# Patient Record
Sex: FEMALE | Race: BLACK OR AFRICAN AMERICAN | Employment: UNEMPLOYED | URBAN - METROPOLITAN AREA
[De-identification: names, ages, dates, MRNs, and addresses within clinical notes are randomized per-mention and may not be internally consistent; named-entity substitution may affect disease eponyms.]

---

## 2020-05-18 ENCOUNTER — ED HISTORICAL/CONVERTED ENCOUNTER (OUTPATIENT)
Dept: OTHER | Age: 30
End: 2020-05-18

## 2020-06-22 ENCOUNTER — APPOINTMENT (OUTPATIENT)
Dept: ULTRASOUND IMAGING | Age: 30
End: 2020-06-22
Attending: EMERGENCY MEDICINE
Payer: MEDICAID

## 2020-06-22 ENCOUNTER — HOSPITAL ENCOUNTER (EMERGENCY)
Age: 30
Discharge: HOME | End: 2020-06-22
Attending: EMERGENCY MEDICINE
Payer: MEDICAID

## 2020-06-22 VITALS
HEART RATE: 96 BPM | WEIGHT: 134 LBS | RESPIRATION RATE: 18 BRPM | SYSTOLIC BLOOD PRESSURE: 150 MMHG | OXYGEN SATURATION: 99 % | TEMPERATURE: 97.5 F | DIASTOLIC BLOOD PRESSURE: 106 MMHG | HEIGHT: 63 IN | BODY MASS INDEX: 23.74 KG/M2

## 2020-06-22 DIAGNOSIS — A59.01 TRICHOMONAL VAGINITIS: Primary | ICD-10-CM

## 2020-06-22 DIAGNOSIS — O46.90 VAGINAL BLEEDING DURING PREGNANCY: ICD-10-CM

## 2020-06-22 LAB
ABO + RH BLD: NORMAL
ALBUMIN SERPL-MCNC: 3.5 G/DL (ref 3.4–5)
ALBUMIN/GLOB SERPL: 0.9 {RATIO} (ref 0.8–1.7)
ALP SERPL-CCNC: 121 U/L (ref 45–117)
ALT SERPL-CCNC: 18 U/L (ref 13–56)
ANION GAP SERPL CALC-SCNC: 7 MMOL/L (ref 3–18)
APPEARANCE UR: CLEAR
AST SERPL-CCNC: 17 U/L (ref 10–38)
BACTERIA URNS QL MICRO: ABNORMAL /HPF
BASOPHILS # BLD: 0 K/UL (ref 0–0.1)
BASOPHILS NFR BLD: 1 % (ref 0–2)
BILIRUB SERPL-MCNC: 0.4 MG/DL (ref 0.2–1)
BILIRUB UR QL: NEGATIVE
BUN SERPL-MCNC: 8 MG/DL (ref 7–18)
BUN/CREAT SERPL: 13 (ref 12–20)
CALCIUM SERPL-MCNC: 8.3 MG/DL (ref 8.5–10.1)
CHLORIDE SERPL-SCNC: 110 MMOL/L (ref 100–111)
CO2 SERPL-SCNC: 26 MMOL/L (ref 21–32)
COLOR UR: YELLOW
CREAT SERPL-MCNC: 0.64 MG/DL (ref 0.6–1.3)
DIFFERENTIAL METHOD BLD: ABNORMAL
EOSINOPHIL # BLD: 0.2 K/UL (ref 0–0.4)
EOSINOPHIL NFR BLD: 2 % (ref 0–5)
EPITH CASTS URNS QL MICRO: ABNORMAL /LPF (ref 0–5)
ERYTHROCYTE [DISTWIDTH] IN BLOOD BY AUTOMATED COUNT: 14.3 % (ref 11.6–14.5)
GLOBULIN SER CALC-MCNC: 3.8 G/DL (ref 2–4)
GLUCOSE SERPL-MCNC: 88 MG/DL (ref 74–99)
GLUCOSE UR STRIP.AUTO-MCNC: NEGATIVE MG/DL
HCG SERPL-ACNC: 6987 MIU/ML (ref 0–10)
HCG UR QL: POSITIVE
HCT VFR BLD AUTO: 37.2 % (ref 35–45)
HGB BLD-MCNC: 12.1 G/DL (ref 12–16)
HGB UR QL STRIP: NEGATIVE
KETONES UR QL STRIP.AUTO: NEGATIVE MG/DL
LEUKOCYTE ESTERASE UR QL STRIP.AUTO: ABNORMAL
LYMPHOCYTES # BLD: 2.9 K/UL (ref 0.9–3.6)
LYMPHOCYTES NFR BLD: 44 % (ref 21–52)
MCH RBC QN AUTO: 31.1 PG (ref 24–34)
MCHC RBC AUTO-ENTMCNC: 32.5 G/DL (ref 31–37)
MCV RBC AUTO: 95.6 FL (ref 74–97)
MONOCYTES # BLD: 0.4 K/UL (ref 0.05–1.2)
MONOCYTES NFR BLD: 7 % (ref 3–10)
NEUTS SEG # BLD: 3.1 K/UL (ref 1.8–8)
NEUTS SEG NFR BLD: 46 % (ref 40–73)
NITRITE UR QL STRIP.AUTO: NEGATIVE
PH UR STRIP: 7 [PH] (ref 5–8)
PLATELET # BLD AUTO: 265 K/UL (ref 135–420)
PMV BLD AUTO: 10.4 FL (ref 9.2–11.8)
POTASSIUM SERPL-SCNC: 3.3 MMOL/L (ref 3.5–5.5)
PROT SERPL-MCNC: 7.3 G/DL (ref 6.4–8.2)
PROT UR STRIP-MCNC: NEGATIVE MG/DL
RBC # BLD AUTO: 3.89 M/UL (ref 4.2–5.3)
RBC #/AREA URNS HPF: ABNORMAL /HPF (ref 0–5)
SERVICE CMNT-IMP: NORMAL
SODIUM SERPL-SCNC: 143 MMOL/L (ref 136–145)
SP GR UR REFRACTOMETRY: 1.01 (ref 1–1.03)
UROBILINOGEN UR QL STRIP.AUTO: 0.2 EU/DL (ref 0.2–1)
WBC # BLD AUTO: 6.6 K/UL (ref 4.6–13.2)
WBC URNS QL MICRO: ABNORMAL /HPF (ref 0–5)
WET PREP GENITAL: NORMAL

## 2020-06-22 PROCEDURE — 85025 COMPLETE CBC W/AUTO DIFF WBC: CPT

## 2020-06-22 PROCEDURE — 86900 BLOOD TYPING SEROLOGIC ABO: CPT

## 2020-06-22 PROCEDURE — 87661 TRICHOMONAS VAGINALIS AMPLIF: CPT

## 2020-06-22 PROCEDURE — 99284 EMERGENCY DEPT VISIT MOD MDM: CPT

## 2020-06-22 PROCEDURE — 81025 URINE PREGNANCY TEST: CPT

## 2020-06-22 PROCEDURE — 84702 CHORIONIC GONADOTROPIN TEST: CPT

## 2020-06-22 PROCEDURE — 80053 COMPREHEN METABOLIC PANEL: CPT

## 2020-06-22 PROCEDURE — 74011250637 HC RX REV CODE- 250/637: Performed by: EMERGENCY MEDICINE

## 2020-06-22 PROCEDURE — 81001 URINALYSIS AUTO W/SCOPE: CPT

## 2020-06-22 PROCEDURE — 87210 SMEAR WET MOUNT SALINE/INK: CPT

## 2020-06-22 PROCEDURE — 76817 TRANSVAGINAL US OBSTETRIC: CPT

## 2020-06-22 RX ORDER — METRONIDAZOLE 250 MG/1
2000 TABLET ORAL
Status: COMPLETED | OUTPATIENT
Start: 2020-06-22 | End: 2020-06-22

## 2020-06-22 RX ADMIN — METRONIDAZOLE 2000 MG: 250 TABLET ORAL at 09:07

## 2020-06-22 NOTE — ED PROVIDER NOTES
EMERGENCY DEPARTMENT HISTORY AND PHYSICAL EXAM    Date: 6/22/2020  Patient Name: Teresa Tang    History of Presenting Illness     Chief Complaint   Patient presents with    Vaginal Bleeding         History Provided By: Patient    Additional History (Context):   Teresa Tang is a 26 y.o. female G4, P3 presents to the emergency department C/O persistent vaginal bleeding ongoing for the last 21 days. Patient states that she had her period on May 25. States that she then started bleeding on June 5. Reports that the vaginal bleeding is minimal.  States that it is usually with \"wiping\". States that she does not use a pad or tampon for the vaginal bleeding as it is light and  \"some days I am not bleeding\". Reports that she took a pregnancy test yesterday night and it was positive. States that she went to an [facility] emergency department for similar complaints \"a couple days ago\" however did not receive an ultrasound or blood work. Patient reports nausea, weight loss and vomiting. States \"this is what happens when I am pregnant\". Patient also reporting pelvic cramping. Pt denies diarrhea or fever, and any other sxs or complaints. PCP: None        Past History     Past Medical History:  History reviewed. No pertinent past medical history. Past Surgical History:  History reviewed. No pertinent surgical history. Family History:  History reviewed. No pertinent family history. Social History:  Social History     Tobacco Use    Smoking status: Current Every Day Smoker     Packs/day: 0.50    Smokeless tobacco: Never Used   Substance Use Topics    Alcohol use: Yes     Comment: occasionally    Drug use: Yes     Types: Marijuana       Allergies:  No Known Allergies      Review of Systems   Review of Systems   Constitutional: Negative for chills and fever. HENT: Negative for congestion, ear pain, sinus pain and sore throat. Eyes: Negative for pain and visual disturbance.    Respiratory: Negative for cough and shortness of breath. Cardiovascular: Negative for chest pain and leg swelling. Gastrointestinal: Positive for nausea and vomiting. Negative for abdominal pain, constipation and diarrhea. Genitourinary: Positive for pelvic pain and vaginal bleeding. Negative for dysuria, hematuria and vaginal discharge. Musculoskeletal: Negative for back pain and neck pain. Skin: Negative for rash and wound. Neurological: Negative for dizziness, tremors, weakness, light-headedness and numbness. All other systems reviewed and are negative. Physical Exam     Vitals:    06/22/20 0649   BP: (!) 150/106   Pulse: 96   Resp: 18   Temp: 97.5 °F (36.4 °C)   SpO2: 99%   Weight: 60.8 kg (134 lb)   Height: 5' 3\" (1.6 m)     Physical Exam    Nursing note and vitals reviewed    Constitutional: Abdi Gonzalez -American female, no acute distress  Head: Normocephalic, Atraumatic  Eyes: Pupils are equal, round, and reactive to light, EOMI  Neck: Supple, non-tender  Cardiovascular: Regular rate and rhythm, no murmurs, rubs, or gallops, + 2 radial pulses bilaterally  Chest: Normal work of breathing and chest excursion bilaterally  Lungs: Clear to ausculation bilaterally, no wheezes, no rhonchi  Abdomen: Soft, non tender, non distended, normoactive bowel sounds  Back: No evidence of trauma or deformity  Extremities: No evidence of trauma or deformity, no LE edema.  No streaking erythema, vesicular lesions, ulcerations or bulla  Skin: Warm and dry, normal cap refill  Neuro: Alert and appropriate, CN intact, normal speech, moving all 4 extremities freely and symmetrically  Psychiatric: Normal mood and affect       Diagnostic Study Results     Labs -     Recent Results (from the past 12 hour(s))   URINALYSIS W/ RFLX MICROSCOPIC    Collection Time: 06/22/20  7:15 AM   Result Value Ref Range    Color YELLOW      Appearance CLEAR      Specific gravity 1.007 1.005 - 1.030      pH (UA) 7.0 5.0 - 8.0      Protein Negative NEG mg/dL Glucose Negative NEG mg/dL    Ketone Negative NEG mg/dL    Bilirubin Negative NEG      Blood Negative NEG      Urobilinogen 0.2 0.2 - 1.0 EU/dL    Nitrites Negative NEG      Leukocyte Esterase SMALL (A) NEG     URINE MICROSCOPIC ONLY    Collection Time: 06/22/20  7:15 AM   Result Value Ref Range    WBC 4 to 10 0 - 5 /hpf    RBC 0 to 3 0 - 5 /hpf    Epithelial cells FEW 0 - 5 /lpf    Bacteria FEW (A) NEG /hpf   HCG URINE, QL. - POC    Collection Time: 06/22/20  7:21 AM   Result Value Ref Range    Pregnancy test,urine (POC) Positive (A) NEG     CBC WITH AUTOMATED DIFF    Collection Time: 06/22/20  7:55 AM   Result Value Ref Range    WBC 6.6 4.6 - 13.2 K/uL    RBC 3.89 (L) 4.20 - 5.30 M/uL    HGB 12.1 12.0 - 16.0 g/dL    HCT 37.2 35.0 - 45.0 %    MCV 95.6 74.0 - 97.0 FL    MCH 31.1 24.0 - 34.0 PG    MCHC 32.5 31.0 - 37.0 g/dL    RDW 14.3 11.6 - 14.5 %    PLATELET 532 680 - 515 K/uL    MPV 10.4 9.2 - 11.8 FL    NEUTROPHILS 46 40 - 73 %    LYMPHOCYTES 44 21 - 52 %    MONOCYTES 7 3 - 10 %    EOSINOPHILS 2 0 - 5 %    BASOPHILS 1 0 - 2 %    ABS. NEUTROPHILS 3.1 1.8 - 8.0 K/UL    ABS. LYMPHOCYTES 2.9 0.9 - 3.6 K/UL    ABS. MONOCYTES 0.4 0.05 - 1.2 K/UL    ABS. EOSINOPHILS 0.2 0.0 - 0.4 K/UL    ABS.  BASOPHILS 0.0 0.0 - 0.1 K/UL    DF AUTOMATED     BLOOD TYPE, (ABO+RH)    Collection Time: 06/22/20  7:55 AM   Result Value Ref Range    ABO/Rh(D) B POSITIVE    METABOLIC PANEL, COMPREHENSIVE    Collection Time: 06/22/20  7:55 AM   Result Value Ref Range    Sodium 143 136 - 145 mmol/L    Potassium 3.3 (L) 3.5 - 5.5 mmol/L    Chloride 110 100 - 111 mmol/L    CO2 26 21 - 32 mmol/L    Anion gap 7 3.0 - 18 mmol/L    Glucose 88 74 - 99 mg/dL    BUN 8 7.0 - 18 MG/DL    Creatinine 0.64 0.6 - 1.3 MG/DL    BUN/Creatinine ratio 13 12 - 20      GFR est AA >60 >60 ml/min/1.73m2    GFR est non-AA >60 >60 ml/min/1.73m2    Calcium 8.3 (L) 8.5 - 10.1 MG/DL    Bilirubin, total 0.4 0.2 - 1.0 MG/DL    ALT (SGPT) 18 13 - 56 U/L    AST (SGOT) 17 10 - 38 U/L    Alk. phosphatase 121 (H) 45 - 117 U/L    Protein, total 7.3 6.4 - 8.2 g/dL    Albumin 3.5 3.4 - 5.0 g/dL    Globulin 3.8 2.0 - 4.0 g/dL    A-G Ratio 0.9 0.8 - 1.7     BETA HCG, QT    Collection Time: 06/22/20  7:55 AM   Result Value Ref Range    Beta HCG, QT 6,987 (H) 0 - 10 MIU/ML   WET PREP    Collection Time: 06/22/20  8:07 AM   Result Value Ref Range    Special Requests: NO SPECIAL REQUESTS      Wet prep FEW  MOTILE TRICHOMONAS NOTED        Wet prep NO YEAST SEEN      Wet prep CLUE CELLS ABSENT         Radiologic Studies -   US OB TV W DOPPLER   Final Result   IMPRESSION:      No suspicious abnormality. No intrauterine gestational sac is evident. If the   patient is pregnant, this represents a pregnancy of unknown location and   appropriate follow-up would then be recommended        CT Results  (Last 48 hours)    None        CXR Results  (Last 48 hours)    None            Medical Decision Making   I am the first provider for this patient. I reviewed the vital signs, available nursing notes, past medical history, past surgical history, family history and social history. Vital Signs-Reviewed the patient's vital signs. Pulse Oximetry Analysis -99 % on room air    Cardiac Monitor:  Rate: 96 bpm  Rhythm: Regular    Records Reviewed: Nursing Notes and Old Medical Records    Provider Notes:   26 y.o. female presenting with vaginal bleeding since June 5. Last menstrual period was May 25. States that she took a pregnancy test last night and was positive. On exam patient is afebrile with appropriate vital signs. She has benign abdominal exams and does not appear toxic or acutely ill. Patient's point-of-care hCG is positive. Will check hCG quant, Rh status. Will perform pelvic exam and obtain transvaginal ultrasound to eval for ectopic, miscarriage. Procedures:  Procedures    ED Course:   7:07 AM   Initial assessment performed.  The patients presenting problems have been discussed, and they are in agreement with the care plan formulated and outlined with them. I have encouraged them to ask questions as they arise throughout their visit. Exam was chaperoned by Sky Art RN  Normal external genitalia. Physiologic discharge, no blood or active bleeding in the vault. Normal cervix appearance. On bimanual exam, no cervical, uterine, or adnexal tenderness. 9:02 AM  Patient is B+. Hemoglobin stable at 12. 1.  hCG quant is 6900. This is above the threshold for ultrasound however no intrauterine gestational sac is evident. This could represent a pregnancy of unknown location. Discussed the findings of her ultrasound and hCG quant. Discussed possibility of miscarriage. Patient urged to return to the emergency department in 2 days to get her hCG quant rechecked. Wet prep consistent with trichomonal infection. Treated with 2 g Flagyl. UA not consistent with a UTI, likely contaminant. Diagnosis and Disposition       DISCHARGE NOTE:  9:02 AM    Brian Driver  results have been reviewed with her. She has been counseled regarding her diagnosis, treatment, and plan. She verbally conveys understanding and agreement of the signs, symptoms, diagnosis, treatment and prognosis and additionally agrees to follow up as discussed. She also agrees with the care-plan and conveys that all of her questions have been answered. I have also provided discharge instructions for her that include: educational information regarding their diagnosis and treatment, and list of reasons why they would want to return to the ED prior to their follow-up appointment, should her condition change. She has been provided with education for proper emergency department utilization. CLINICAL IMPRESSION:    1. Trichomonal vaginitis    2. Vaginal bleeding during pregnancy        PLAN:  1. D/C Home  2. There are no discharge medications for this patient.     3.   Follow-up Information     Follow up With Specialties Details Why Contact Info    [facility]  Schedule an appointment as soon as possible for a visit in 2 days   [address]  [place], [address] [place] [address],[address]    Yoana Rojas MD Obstetrics & Gynecology Schedule an appointment as soon as possible for a visit in 2 days  [address]      [facility] EMERGENCY DEPT Emergency Medicine Go in 2 days to get your pregnancy hormone rechecked [address] [place] [address] [facility]    [facility] EMERGENCY DEPT Emergency Medicine  As needed if symptoms worsen [address] [place] [id number]  [phone]        ____________________________________     Please note that this dictation was completed with [employer], the computer voice recognition software. Quite often unanticipated grammatical, syntax, homophones, and other interpretive errors are inadvertently transcribed by the computer software. Please disregard these errors. Please excuse any errors that have escaped final proofreading.

## 2020-06-22 NOTE — ED TRIAGE NOTES
Patient arrives ambulatory to ED with c/c of wanting to know if she is pregnant or not. Last month she took a pregnancy test and it was positive, the next day she began to bleed and she's been bleeding ever since (21 days straight), patient took a pregnancy test last night and it was positive. Patient reports losing a lot of weight and she only does that when she is pregnant.

## 2020-06-22 NOTE — DISCHARGE INSTRUCTIONS
You were seen and evaluated in the Emergency Department. Please understand that your work up is not all encompassing and you should follow up with your primary care physician for further management and continuity of care. Please return to Emergency Department or seek medical attention immediately if you have acute worsening in your symptoms or develop chest pain, shortness of breath, repeated vomiting, fever, altered level of consciousness, coughing up blood, or start sweating and feel clammy. If you were prescribed any medicine for home, please take as prescribed by your health-care provider. If you were given any follow-up appointments or numbers to call, please do so as instructed. Avoid any tobacco products or excessive alcohol. Patient Education        Vaginal Bleeding During Pregnancy: Care Instructions  Your Care Instructions     Many women have some vaginal bleeding when they are pregnant. In some cases, the bleeding is not serious. And there aren't any more problems with the pregnancy. But sometimes bleeding is a sign of a more serious problem. This is more common if the bleeding is heavy or painful. Examples of more serious problems include miscarriage, an ectopic pregnancy, or a problem with the placenta. You may have to see your doctor again to be sure everything is okay. You may also need more tests to find the cause of the bleeding. For some women, home treatment is all they need. But it depends on what is causing the bleeding. Be sure to tell your doctor if you have any new symptoms or if your symptoms get worse. The doctor has checked you carefully, but problems can develop later. If you notice any problems or new symptoms, get medical treatment right away. Follow-up care is a key part of your treatment and safety. Be sure to make and go to all appointments, and call your doctor if you are having problems.  It's also a good idea to know your test results and keep a list of the medicines you take. How can you care for yourself at home? · If your doctor prescribed medicines, take them exactly as directed. Call your doctor if you think you are having a problem with your medicine. · Do not have sex until the bleeding stops and your doctor says it's okay. · Ask your doctor about other activities you can or can't do. · Get a lot of rest. Being pregnant can make you tired. · Eat a healthy diet. Include a lot of peas, beans, and leafy green vegetables. Talk to a dietitian if you need help planning your diet. · Do not use nonsteroidal anti-inflammatory drugs (NSAIDs), such as ibuprofen (Advil, Motrin), naproxen (Aleve), or aspirin, unless your doctor says it is okay. When should you call for help? [id number] anytime you think you may need emergency care. For example, call if:  · You passed out (lost consciousness). · You have severe vaginal bleeding. Call your doctor now or seek immediate medical care if:  · You have any vaginal bleeding. · You have pain in your belly or pelvis. · You think that you are in labor. · You have a sudden release of fluid from your vagina. · You notice that your baby has stopped moving or is moving much less than normal.  Watch closely for changes in your health, and be sure to contact your doctor if you have any questions or concerns. Where can you learn more? Go to http://can-jaylin.info/  Enter J785 in the search box to learn more about \"Vaginal Bleeding During Pregnancy: Care Instructions. \"  Current as of: February 11, 2020               Content Version: 12.5  © 4747-7651 Healthwise, Incorporated. Care instructions adapted under license by [employer] (which disclaims liability or warranty for this information). If you have questions about a medical condition or this instruction, always ask your healthcare professional. [address] any warranty or liability for your use of this information. Patient Education        Trichomoniasis: Care Instructions  Your Care Instructions  Trichomoniasis is a sexually transmitted infection (STI) that is spread by having sex with an infected partner. Trichomoniasis is commonly called trich (say \"trick\"). In women, trich may cause vaginal itching and a smelly discharge. But in many cases, especially in men, there are no symptoms. Pecola Dirk is treated so that you do not spread it to others. Both you and your sex partner or partners should be treated at the same time so you do not infect each other again. Trich may cause problems with pregnancy. Your doctor will talk with you about treatment for Trich if you are pregnant. Follow-up care is a key part of your treatment and safety. Be sure to make and go to all appointments, and call your doctor if you are having problems. It's also a good idea to know your test results and keep a list of the medicines you take. How can you care for yourself at home? · Take your antibiotics as directed. Do not stop taking them just because you feel better. You need to take the full course of antibiotics. · Do not have sex while you are being treated. If your doctor gave you a single dose of antibiotics, do not have sex for one week after being treated and until your partner also has been treated. · Tell your sex partner (or partners) that he or she will also need to be tested and treated. · Use a cold water compress or cool baths to relieve itching. To prevent trichomoniasis in the future  · Use latex condoms every time you have sex. Use them from the beginning to the end of sexual contact. · Talk to your partner before having sex. Find out if he or she has or is at risk for trich or any other STI. Keep in mind that a person may be able to spread an STI even if he or she does not have symptoms. · Do not have sex if you are being treated for trich or any other STI.   · Do not have sex with anyone who has symptoms of an STI, such as sores on the genitals or mouth. · Having one sex partner (who does not have STIs and does not have sex with anyone else) is a good way to avoid STIs. When should you call for help? Call your doctor now or seek immediate medical care if:  · You have unusual vaginal bleeding. · You have a fever. · You have new discharge from the vagina or penis. · You have pelvic pain. Watch closely for changes in your health, and be sure to contact your doctor if:  · You do not get better as expected. · You have any new symptoms or your symptoms get worse. Where can you learn more? Go to http://can-jaylin.info/  Enter P819 in the search box to learn more about \"Trichomoniasis: Care Instructions. \"  Current as of: February 26, 2020               Content Version: 12.5  © 2006-2020 [email or web address]. Care instructions adapted under license by [employer] (which disclaims liability or warranty for this information). If you have questions about a medical condition or this instruction, always ask your healthcare professional. [place] [address] any warranty or liability for your use of this information. Patient Education     Threatened Miscarriage: After Your Visit to the Emergency Room  Your Care Instructions  Some women have light spotting or bleeding during the first 12 weeks of pregnancy. In some cases this is normal. Light spotting or bleeding can also be a sign of a possible loss of the pregnancy. This is called a threatened miscarriage. At this point, the doctor may not be able to tell if your vaginal bleeding is normal or a threatened miscarriage. In early pregnancy, things such as stress, exercise, and sex do not cause vaginal bleeding or miscarriage. You may be worried or upset about the possibility of losing your pregnancy. But do not blame yourself. There is no treatment to stop a threatened miscarriage.  If you do have a miscarriage, there was nothing you could have done to prevent it. A miscarriage usually means that the pregnancy is not developing normally. Even though you have been released from the emergency room, you still need to watch for any problems. The doctor carefully checked you. But sometimes problems can develop later. If you have new symptoms, or if your symptoms do not get better, return to the emergency room or call your doctor right away. A visit to the emergency room is only one step in your treatment. Even if you feel better, you still need to do what your doctor recommends, such as going to all suggested follow-up appointments and taking medicines exactly as directed. This will help you recover and help prevent future problems. How can you care for yourself at home? · If you do have a miscarriage, you will probably have some vaginal bleeding for 1 to 2 weeks. Use pads instead of tampons. Count the pads you use every day, and write it down. This will help you know if the bleeding is stopping. · Take acetaminophen (Tylenol) for cramps. Read and follow all instructions on the label. · Do not take two or more pain medicines at the same time unless the doctor told you to. Many pain medicines have acetaminophen, which is Tylenol. Too much acetaminophen (Tylenol) can be harmful. · Do not have sex until your doctor says it is okay. · Get lots of rest over the next several days. · You may do your normal activities if you feel well enough to do them. But do not do any heavy exercise until your doctor says it is okay. · Eat a balanced diet that is high in iron and vitamin C. Foods rich in iron include red meat, shellfish, eggs, beans, and leafy green vegetables. Foods high in vitamin C include citrus fruits, tomatoes, and broccoli. Talk to your doctor about whether you need to take iron pills or a multivitamin. · Do not drink alcohol or use tobacco or illegal drugs. · Do not smoke.  If you need help quitting, talk to your doctor about stop-smoking programs and medicines. These can increase your chances of quitting for good. When should you call for help? Call 911 if:  · You have sudden, severe pain in your belly. · You passed out (lost consciousness). Return to the emergency room now if:  · You have severe vaginal bleeding. You are passing blood clots and soaking through your usual pads each hour for 2 or more hours. · Vaginal bleeding restarts. Call your doctor today if:  · You have cramps or pain in your belly or pelvis. · You have a fever. · You have vaginal discharge that smells bad. · Vaginal bleeding has not stopped completely after 3 days. Where can you learn more? Go to svh24.de.be  Enter T476 in the search box to learn more about \"Threatened Miscarriage: After Your Visit to the Emergency Room. \"   © 4502-1104 Healthwise, Incorporated. Care instructions adapted under license by [facility] (which disclaims liability or warranty for this information). This care instruction is for use with your licensed healthcare professional. If you have questions about a medical condition or this instruction, always ask your healthcare professional. [place] [address] any warranty or liability for your use of this information. Content Version: 9.4.50224;  Last Revised: December 9, 2010

## 2020-06-24 LAB
C TRACH RRNA SPEC QL NAA+PROBE: NEGATIVE
N GONORRHOEA RRNA SPEC QL NAA+PROBE: NEGATIVE
SPECIMEN SOURCE: ABNORMAL
T VAGINALIS RRNA SPEC QL NAA+PROBE: POSITIVE

## 2024-02-21 ENCOUNTER — APPOINTMENT (OUTPATIENT)
Facility: HOSPITAL | Age: 34
End: 2024-02-21
Payer: MEDICAID

## 2024-02-21 ENCOUNTER — HOSPITAL ENCOUNTER (EMERGENCY)
Facility: HOSPITAL | Age: 34
Discharge: HOME | End: 2024-02-21
Attending: EMERGENCY MEDICINE
Payer: MEDICAID

## 2024-02-21 VITALS
DIASTOLIC BLOOD PRESSURE: 78 MMHG | HEIGHT: 63 IN | TEMPERATURE: 99.6 F | BODY MASS INDEX: 24.27 KG/M2 | WEIGHT: 137 LBS | HEART RATE: 90 BPM | OXYGEN SATURATION: 99 % | RESPIRATION RATE: 16 BRPM | SYSTOLIC BLOOD PRESSURE: 132 MMHG

## 2024-02-21 DIAGNOSIS — N76.0 BV (BACTERIAL VAGINOSIS): ICD-10-CM

## 2024-02-21 DIAGNOSIS — Z20.2 POSSIBLE EXPOSURE TO STD: ICD-10-CM

## 2024-02-21 DIAGNOSIS — B96.89 BV (BACTERIAL VAGINOSIS): ICD-10-CM

## 2024-02-21 DIAGNOSIS — N73.0 PID (ACUTE PELVIC INFLAMMATORY DISEASE): Primary | ICD-10-CM

## 2024-02-21 LAB
ALBUMIN SERPL-MCNC: 3.1 G/DL (ref 3.5–5)
ALBUMIN/GLOB SERPL: 0.6 (ref 1.1–2.2)
ALP SERPL-CCNC: 118 U/L (ref 45–117)
ALT SERPL-CCNC: 20 U/L (ref 12–78)
ANION GAP SERPL CALC-SCNC: 8 MMOL/L (ref 5–15)
APPEARANCE UR: CLEAR
AST SERPL W P-5'-P-CCNC: 15 U/L (ref 15–37)
BACTERIA URNS QL MICRO: NEGATIVE /HPF
BASOPHILS # BLD: 0 K/UL (ref 0–0.1)
BASOPHILS NFR BLD: 0 % (ref 0–1)
BILIRUB SERPL-MCNC: 0.4 MG/DL (ref 0.2–1)
BILIRUB UR QL CFM: NEGATIVE
BILIRUB UR QL: ABNORMAL
BUN SERPL-MCNC: 12 MG/DL (ref 6–20)
BUN/CREAT SERPL: 13 (ref 12–20)
C TRACH DNA SPEC QL NAA+PROBE: POSITIVE
CA-I BLD-MCNC: 8.5 MG/DL (ref 8.5–10.1)
CHLORIDE SERPL-SCNC: 102 MMOL/L (ref 97–108)
CLUE CELLS VAG QL WET PREP: ABNORMAL
CO2 SERPL-SCNC: 28 MMOL/L (ref 21–32)
COLOR UR: ABNORMAL
CREAT SERPL-MCNC: 0.89 MG/DL (ref 0.55–1.02)
DIFFERENTIAL METHOD BLD: ABNORMAL
EOSINOPHIL # BLD: 0.1 K/UL (ref 0–0.4)
EOSINOPHIL NFR BLD: 1 % (ref 0–7)
EPITH CASTS URNS QL MICRO: ABNORMAL /LPF
ERYTHROCYTE [DISTWIDTH] IN BLOOD BY AUTOMATED COUNT: 13.2 % (ref 11.5–14.5)
GLOBULIN SER CALC-MCNC: 5.3 G/DL (ref 2–4)
GLUCOSE SERPL-MCNC: 107 MG/DL (ref 65–100)
GLUCOSE UR STRIP.AUTO-MCNC: NEGATIVE MG/DL
HCG SERPL QL: NEGATIVE
HCG UR QL: NEGATIVE
HCT VFR BLD AUTO: 34.8 % (ref 35–47)
HGB BLD-MCNC: 11.6 G/DL (ref 11.5–16)
HGB UR QL STRIP: ABNORMAL
IMM GRANULOCYTES # BLD AUTO: 0 K/UL (ref 0–0.04)
IMM GRANULOCYTES NFR BLD AUTO: 0 % (ref 0–0.5)
KETONES UR QL STRIP.AUTO: 15 MG/DL
KOH PREP SPEC: NORMAL
LEUKOCYTE ESTERASE UR QL STRIP.AUTO: ABNORMAL
LIPASE SERPL-CCNC: 38 U/L (ref 13–75)
LYMPHOCYTES # BLD: 1.6 K/UL (ref 0.8–3.5)
LYMPHOCYTES NFR BLD: 13 % (ref 12–49)
Lab: NORMAL
MCH RBC QN AUTO: 31.5 PG (ref 26–34)
MCHC RBC AUTO-ENTMCNC: 33.3 G/DL (ref 30–36.5)
MCV RBC AUTO: 94.6 FL (ref 80–99)
MONOCYTES # BLD: 1.1 K/UL (ref 0–1)
MONOCYTES NFR BLD: 9 % (ref 5–13)
MUCOUS THREADS URNS QL MICRO: ABNORMAL /LPF
N GONORRHOEA DNA SPEC QL NAA+PROBE: NEGATIVE
NEUTS SEG # BLD: 9.9 K/UL (ref 1.8–8)
NEUTS SEG NFR BLD: 77 % (ref 32–75)
NITRITE UR QL STRIP.AUTO: NEGATIVE
PH UR STRIP: 7 (ref 5–8)
PLATELET # BLD AUTO: 307 K/UL (ref 150–400)
PMV BLD AUTO: 10 FL (ref 8.9–12.9)
POTASSIUM SERPL-SCNC: 3.7 MMOL/L (ref 3.5–5.1)
PROT SERPL-MCNC: 8.4 G/DL (ref 6.4–8.2)
PROT UR STRIP-MCNC: ABNORMAL MG/DL
RBC # BLD AUTO: 3.68 M/UL (ref 3.8–5.2)
RBC #/AREA URNS HPF: ABNORMAL /HPF (ref 0–5)
SAMPLE TYPE: ABNORMAL
SERVICE CMNT-IMP: ABNORMAL
SODIUM SERPL-SCNC: 138 MMOL/L (ref 136–145)
SP GR UR REFRACTOMETRY: 1.01 (ref 1–1.03)
SPECIMEN SOURCE: ABNORMAL
T VAGINALIS VAG QL WET PREP: NEGATIVE
UROBILINOGEN UR QL STRIP.AUTO: 1 EU/DL (ref 0.2–1)
WBC # BLD AUTO: 12.7 K/UL (ref 3.6–11)
WBC URNS QL MICRO: ABNORMAL /HPF (ref 0–4)

## 2024-02-21 PROCEDURE — 87591 N.GONORRHOEAE DNA AMP PROB: CPT

## 2024-02-21 PROCEDURE — 36415 COLL VENOUS BLD VENIPUNCTURE: CPT

## 2024-02-21 PROCEDURE — 99285 EMERGENCY DEPT VISIT HI MDM: CPT

## 2024-02-21 PROCEDURE — 76830 TRANSVAGINAL US NON-OB: CPT

## 2024-02-21 PROCEDURE — 87210 SMEAR WET MOUNT SALINE/INK: CPT

## 2024-02-21 PROCEDURE — 85025 COMPLETE CBC W/AUTO DIFF WBC: CPT

## 2024-02-21 PROCEDURE — 87491 CHLMYD TRACH DNA AMP PROBE: CPT

## 2024-02-21 PROCEDURE — 81001 URINALYSIS AUTO W/SCOPE: CPT

## 2024-02-21 PROCEDURE — 96374 THER/PROPH/DIAG INJ IV PUSH: CPT

## 2024-02-21 PROCEDURE — 2580000003 HC RX 258: Performed by: EMERGENCY MEDICINE

## 2024-02-21 PROCEDURE — 81025 URINE PREGNANCY TEST: CPT

## 2024-02-21 PROCEDURE — 83690 ASSAY OF LIPASE: CPT

## 2024-02-21 PROCEDURE — 80053 COMPREHEN METABOLIC PANEL: CPT

## 2024-02-21 PROCEDURE — 84703 CHORIONIC GONADOTROPIN ASSAY: CPT

## 2024-02-21 PROCEDURE — 6360000004 HC RX CONTRAST MEDICATION: Performed by: EMERGENCY MEDICINE

## 2024-02-21 PROCEDURE — 74177 CT ABD & PELVIS W/CONTRAST: CPT

## 2024-02-21 PROCEDURE — 6360000002 HC RX W HCPCS: Performed by: EMERGENCY MEDICINE

## 2024-02-21 RX ORDER — METRONIDAZOLE 500 MG/1
500 TABLET ORAL 2 TIMES DAILY
Qty: 28 TABLET | Refills: 0 | Status: SHIPPED | OUTPATIENT
Start: 2024-02-21 | End: 2024-03-06

## 2024-02-21 RX ORDER — DOXYCYCLINE HYCLATE 100 MG
100 TABLET ORAL 2 TIMES DAILY
Qty: 28 TABLET | Refills: 0 | Status: SHIPPED | OUTPATIENT
Start: 2024-02-21 | End: 2024-03-06

## 2024-02-21 RX ADMIN — IOPAMIDOL 100 ML: 755 INJECTION, SOLUTION INTRAVENOUS at 13:31

## 2024-02-21 RX ADMIN — CEFTRIAXONE SODIUM 500 MG: 500 INJECTION, POWDER, FOR SOLUTION INTRAMUSCULAR; INTRAVENOUS at 14:56

## 2024-02-21 ASSESSMENT — PAIN SCALES - GENERAL: PAINLEVEL_OUTOF10: 7

## 2024-02-21 ASSESSMENT — PAIN - FUNCTIONAL ASSESSMENT: PAIN_FUNCTIONAL_ASSESSMENT: 0-10

## 2024-02-21 NOTE — ED PROVIDER NOTES
Negative      Urobilinogen, Urine 1.0 0.2 - 1.0 EU/dL    Nitrite, Urine Negative Negative      Leukocyte Esterase, Urine Small (A) Negative      Bilirubin Confirmation, UA Negative Negative     Urinalysis, Micro    Collection Time: 02/21/24 10:24 AM   Result Value Ref Range    WBC, UA 0-4 0 - 4 /hpf    RBC, UA 20-50 0 - 5 /hpf    Epithelial Cells UA Moderate (A) Few /lpf    BACTERIA, URINE Negative Negative /hpf    Mucus, UA 3+ (A) Negative /lpf   POC Pregnancy Urine Qual    Collection Time: 02/21/24 10:25 AM   Result Value Ref Range    Preg Test, Ur Negative Negative     KOH, Other Sources    Collection Time: 02/21/24 10:30 AM    Specimen: Vaginal swab   Result Value Ref Range    Special Requests No Special Requests      PAMELA Prep No yeast seen     Wet prep, genital    Collection Time: 02/21/24 10:30 AM    Specimen: Miscellaneous sample   Result Value Ref Range    Clue Cells, Wet Prep Clue Cells present (A) Negative      Trich, Wet Prep Negative Negative         EKG: Not Applicable    Radiologic Studies:  Non-plain film images such as CT, Ultrasound and MRI are read by the radiologist. Plain radiographic images are visualized and preliminarily interpreted by the ED Physician with the following findings: Not Applicable.    Interpretation per the Radiologist below, if available at the time of this note:  CT ABDOMEN PELVIS W IV CONTRAST Additional Contrast? None   Final Result   1. No uterine abnormality demonstrated. Normal appearance of the endometrial   stripe.   2. Doppler flow documented in the ovaries bilaterally. Incidental right   follicular cyst measuring 1.7 cm.   3. Complex structure in left adnexal region could represent a dilated oviduct.   There is mild-moderate free intrapelvic fluid. Consider the possibility of   pelvic inflammatory disease.          US PELVIS COMPLETE NON-OB TRANSABDOMINAL AND TRANSVAGINAL   Final Result   1. No uterine abnormality demonstrated. Normal appearance of the endometrial

## 2024-02-21 NOTE — DISCHARGE INSTRUCTIONS
Complex structure in left adnexal region could represent a dilated oviduct.   There is mild-moderate free intrapelvic fluid. Consider the possibility of   pelvic inflammatory disease.          US PELVIS COMPLETE NON-OB TRANSABDOMINAL AND TRANSVAGINAL   Final Result   1. No uterine abnormality demonstrated. Normal appearance of the endometrial   stripe.   2. Doppler flow documented in the ovaries bilaterally. Incidental right   follicular cyst measuring 1.7 cm.   3. Complex structure in left adnexal region could represent a dilated oviduct.   There is mild-moderate free intrapelvic fluid. Consider the possibility of   pelvic inflammatory disease.            ------------------------------------------------------------------------------------------------------------  The exam and treatment you received in the Emergency Department were for an urgent problem and are not intended as complete care. It is important that you follow-up with a doctor, nurse practitioner, or physician assistant to:  (1) confirm your diagnosis,  (2) re-evaluation of changes in your illness and treatment, and (3) for ongoing care. Please take your discharge instructions with you when you go to your follow-up appointment.     If you have any problem arranging a follow-up appointment, contact the Emergency Department.  If your symptoms become worse or you do not improve as expected and you are unable to reach your health care provider, please return to the Emergency Department. We are available 24 hours a day.     If a prescription has been provided, please have it filled as soon as possible to prevent a delay in treatment. If you have any questions or reservations about taking the medication due to side effects or interactions with other medications, please call your primary care provider or contact the ER.

## 2024-04-03 ENCOUNTER — OFFICE VISIT (OUTPATIENT)
Age: 34
End: 2024-04-03
Payer: MEDICAID

## 2024-04-03 DIAGNOSIS — Z11.3 SCREENING FOR STD (SEXUALLY TRANSMITTED DISEASE): ICD-10-CM

## 2024-04-03 DIAGNOSIS — Z12.4 SCREENING FOR MALIGNANT NEOPLASM OF CERVIX: ICD-10-CM

## 2024-04-03 DIAGNOSIS — Z01.419 ENCOUNTER FOR ANNUAL ROUTINE GYNECOLOGICAL EXAMINATION: Primary | ICD-10-CM

## 2024-04-03 PROCEDURE — 99385 PREV VISIT NEW AGE 18-39: CPT | Performed by: OBSTETRICS & GYNECOLOGY

## 2024-04-03 PROCEDURE — 99459 PELVIC EXAMINATION: CPT | Performed by: OBSTETRICS & GYNECOLOGY

## 2024-04-03 NOTE — PROGRESS NOTES
John is a 33 y.o. female who presents today for the following:    Chief Complaint   Patient presents with    New Patient    Annual Exam     Was treated in ER 2/21/24 for possible STD and BV; tested positive for chlamydia        No Known Allergies     No current outpatient medications on file.     History reviewed. No pertinent past medical history.     History reviewed. No pertinent surgical history.     History reviewed. No pertinent family history.     Social History     Socioeconomic History    Marital status: Single     Spouse name: None    Number of children: None    Years of education: None    Highest education level: None   Tobacco Use    Smoking status: Every Day     Current packs/day: 0.50     Types: Cigarettes    Smokeless tobacco: Never   Substance and Sexual Activity    Alcohol use: Yes    Drug use: Yes     Types: Marijuana (Weed)        HPI   Here today for annual exam.  Patient had a recent ER visit due to PID.  Tested positive for chlamydia.  She is actually asymptomatic.    Review of Systems     REVIEW OF SYSTEMS:  Constitutional: Negative  HEENT: Negative  Eyes: Negative  Respiratory: Negative  Cardiovascular: Negative  Gastrointestinal: Negative  Genitourinary: Negative  Musculoskeletal: Negative  Skin: Negative  Neurological: Negative  Endo/heme: Negative  Psychiatric/behavioral: Negative    There were no vitals filed for this visit.   OBGyn Exam   Constitutional  .  Appearance: Well-nourished, well-developed, alert, in no acute distress    HEENT  .  Head and face: Appears normal    Neck  .  Inspection/palpation: Normal appearance, no masses or tenderness  .  Lymph nodes: No lymphadenopathy present  .  Thyroid: Gland size normal, nontender, no nodules or masses present on palpation    Breasts  .  Symmetric, no palpable masses, no tenderness, no skin changes, no nipple abnormality, no nipple discharge, no lymphadenopathy    Chest  .  Respiratory effort: Even and unlabored  .  Auscultation:

## 2024-04-06 LAB
., LABCORP: NORMAL
C TRACH RRNA CVX QL NAA+PROBE: NEGATIVE
CYTOLOGIST CVX/VAG CYTO: NORMAL
CYTOLOGY CVX/VAG DOC CYTO: NORMAL
CYTOLOGY CVX/VAG DOC THIN PREP: NORMAL
DX ICD CODE: NORMAL
Lab: NORMAL
N GONORRHOEA RRNA CVX QL NAA+PROBE: NEGATIVE
OTHER STN SPEC: NORMAL
STAT OF ADQ CVX/VAG CYTO-IMP: NORMAL
T VAGINALIS RRNA SPEC QL NAA+PROBE: NEGATIVE

## 2025-03-18 ENCOUNTER — HOSPITAL ENCOUNTER (EMERGENCY)
Facility: HOSPITAL | Age: 35
Discharge: HOME | End: 2025-03-18
Payer: MEDICAID

## 2025-03-18 VITALS
RESPIRATION RATE: 16 BRPM | SYSTOLIC BLOOD PRESSURE: 138 MMHG | HEIGHT: 63 IN | TEMPERATURE: 98.4 F | OXYGEN SATURATION: 100 % | DIASTOLIC BLOOD PRESSURE: 86 MMHG | BODY MASS INDEX: 23.92 KG/M2 | WEIGHT: 135 LBS | HEART RATE: 90 BPM

## 2025-03-18 DIAGNOSIS — J06.9 VIRAL URI: Primary | ICD-10-CM

## 2025-03-18 PROCEDURE — 99283 EMERGENCY DEPT VISIT LOW MDM: CPT

## 2025-03-18 RX ORDER — GUAIFENESIN/DEXTROMETHORPHAN 100-10MG/5
5 SYRUP ORAL 3 TIMES DAILY PRN
Qty: 120 ML | Refills: 0 | Status: SHIPPED | OUTPATIENT
Start: 2025-03-18 | End: 2025-03-28

## 2025-03-18 RX ORDER — ACETAMINOPHEN 500 MG
1000 TABLET ORAL 3 TIMES DAILY PRN
Qty: 30 TABLET | Refills: 0 | Status: SHIPPED | OUTPATIENT
Start: 2025-03-18 | End: 2025-03-28

## 2025-03-18 RX ORDER — IBUPROFEN 600 MG/1
600 TABLET, FILM COATED ORAL 3 TIMES DAILY PRN
Qty: 30 TABLET | Refills: 0 | Status: SHIPPED | OUTPATIENT
Start: 2025-03-18

## 2025-03-18 ASSESSMENT — PAIN - FUNCTIONAL ASSESSMENT: PAIN_FUNCTIONAL_ASSESSMENT: 0-10

## 2025-03-18 ASSESSMENT — PAIN SCALES - GENERAL: PAINLEVEL_OUTOF10: 7

## 2025-03-18 NOTE — ED PROVIDER NOTES
adventitious lung sounds to upper or  lower lobes.  Abdominal:      General: Abdomen is flat. There is no distension.      Palpations: Abdomen is soft.      Tenderness: There is no abdominal tenderness. There is no guarding.   Musculoskeletal:         General: Normal range of motion.      Cervical back: Normal range of motion and neck supple.   Skin:     General: Skin is warm and dry.      Capillary Refill: Capillary refill takes less than 2 seconds.   Neurological:      General: No focal deficit present.      Mental Status: She is alert and oriented to person, place, and time.   Psychiatric:         Mood and Affect: Mood normal.         Behavior: Behavior normal.         Thought Content: Thought content normal.         Judgment: Judgment normal.           SCREENINGS                  LAB, EKG AND DIAGNOSTIC RESULTS   Labs:  No results found for this or any previous visit (from the past 12 hours).    EKG: Not Applicable    Radiologic Studies:  Non-plain film images such as CT, Ultrasound and MRI are read by the radiologist. Plain radiographic images are visualized and preliminarily interpreted by the ED Physician with the following findings: Not Applicable.    Interpretation per the Radiologist below, if available at the time of this note:  No orders to display        ED COURSE and DIFFERENTIAL DIAGNOSIS/MDM   12:30 PM Differential and Considerations: The patient complains of nasal congestion, rhinorrhea, and sore throat. Has non-productive cough without dyspnea or wheezing. Symptoms are consistent with an uncomplicated viral URI.  DDx: bacterial sinusitis vs. pharyngitis, migraine, flu. I considered swabs on this patient and did them if indicated with shared decision making.  Patient has no acute vital sign abnormalities symptoms reported as \"mild\" by patient.    Symptomatic therapy suggested: acetaminophen, ibuprofen, Robitussin. Increase fluids, use vaporizer, stay in steamy bathroom tid 15 min prn severe cough,  tylenol as needed, rest, avoid smoky areas. Lack of antibiotic effectiveness discussed with her. Symptomatic therapy suggested: gargle for sore throat, use mist at bedside for congestion.  Apply facial warm packs for sinus pain or use nasal saline sprays. Follow up prn if not better in 72 hours.      Records Reviewed (source and summary of external notes): Prior medical records and Nursing notes    Vitals:    Vitals:    03/18/25 1158 03/18/25 1200   BP: 138/86    Pulse: 90    Resp: 16    Temp: 98.4 °F (36.9 °C)    SpO2: 100%    Weight:  61.2 kg (135 lb)   Height:  1.6 m (5' 3\")        ED COURSE       SEPSIS Reassessment: Patient does NOT meet Sepsis criteria after ED workup    Clinical Management Tools:  Not Applicable    Patient was given the following medications:  Medications - No data to display    CONSULTS: See ED Course/MDM for further details.  None     Social Determinants affecting Diagnosis/Treatment: None    Smoking Cessation: Not Applicable    PROCEDURES   Unless otherwise noted above, none.  Procedures      CRITICAL CARE TIME   Patient does not meet Critical Care Time, 0 minutes    ED IMPRESSION     1. Viral URI          DISPOSITION/PLAN   DISPOSITION Decision To Discharge 03/18/2025 12:27:46 PM   DISPOSITION CONDITION Stable        Discharge Note: The patient is stable for discharge home. The signs, symptoms, diagnosis, and discharge instructions have been discussed, understanding conveyed, and agreed upon. The patient is to follow up as recommended or return to ER should their symptoms worsen.      PATIENT REFERRED TO:  [facility] and [address], [place] [address]    Phone: [phone]  Schedule an appointment as soon as possible for a visit   OR  your PCP        DISCHARGE MEDICATIONS:     Medication List        START taking these medications      acetaminophen 500 MG tablet  Commonly known as: TYLENOL  Take 2 tablets by mouth 3 times daily as needed for Pain

## 2025-04-10 ENCOUNTER — APPOINTMENT (OUTPATIENT)
Facility: HOSPITAL | Age: 35
End: 2025-04-10
Payer: MEDICAID

## 2025-04-10 ENCOUNTER — HOSPITAL ENCOUNTER (EMERGENCY)
Facility: HOSPITAL | Age: 35
Discharge: HOME | End: 2025-04-10
Payer: MEDICAID

## 2025-04-10 VITALS
HEART RATE: 105 BPM | DIASTOLIC BLOOD PRESSURE: 82 MMHG | TEMPERATURE: 98.2 F | RESPIRATION RATE: 18 BRPM | OXYGEN SATURATION: 100 % | SYSTOLIC BLOOD PRESSURE: 125 MMHG

## 2025-04-10 DIAGNOSIS — S83.91XA SPRAIN OF RIGHT KNEE, UNSPECIFIED LIGAMENT, INITIAL ENCOUNTER: Primary | ICD-10-CM

## 2025-04-10 PROCEDURE — 6370000000 HC RX 637 (ALT 250 FOR IP): Performed by: NURSE PRACTITIONER

## 2025-04-10 PROCEDURE — 99283 EMERGENCY DEPT VISIT LOW MDM: CPT

## 2025-04-10 PROCEDURE — 73562 X-RAY EXAM OF KNEE 3: CPT

## 2025-04-10 RX ORDER — IBUPROFEN 600 MG/1
600 TABLET, FILM COATED ORAL 3 TIMES DAILY PRN
Qty: 30 TABLET | Refills: 0 | Status: SHIPPED | OUTPATIENT
Start: 2025-04-10

## 2025-04-10 RX ORDER — IBUPROFEN 600 MG/1
600 TABLET, FILM COATED ORAL
Status: COMPLETED | OUTPATIENT
Start: 2025-04-10 | End: 2025-04-10

## 2025-04-10 RX ADMIN — IBUPROFEN 600 MG: 600 TABLET, FILM COATED ORAL at 00:50

## 2025-04-10 ASSESSMENT — LIFESTYLE VARIABLES
HOW OFTEN DO YOU HAVE A DRINK CONTAINING ALCOHOL: 2-3 TIMES A WEEK
HOW MANY STANDARD DRINKS CONTAINING ALCOHOL DO YOU HAVE ON A TYPICAL DAY: 3 OR 4

## 2025-04-10 ASSESSMENT — PAIN DESCRIPTION - DESCRIPTORS: DESCRIPTORS: ACHING

## 2025-04-10 ASSESSMENT — PAIN - FUNCTIONAL ASSESSMENT: PAIN_FUNCTIONAL_ASSESSMENT: 0-10

## 2025-04-10 ASSESSMENT — PAIN DESCRIPTION - LOCATION: LOCATION: KNEE

## 2025-04-10 NOTE — DISCHARGE INSTRUCTIONS
Thank you for choosing our Emergency Department for your care.  It is our privilege to care for you in your time of need.  In the next several days, you may receive a survey via email or mailed to your home about your experience with our team.  We would greatly appreciate you taking a few minutes to complete the survey, as we use this information to learn what we have done well and what we could be doing better. Thank you for trusting us with your care!    Below you will find a list of your tests from today's visit.   Labs and Radiology Studies  No results found for this or any previous visit (from the past 12 hours).  XR KNEE RIGHT (3 VIEWS)  Result Date: 4/10/2025  INDICATION: right knee pain Sprain of unspecified site of right knee, initial encounter / Reason for exam:->right knee pain COMPARISON: None FINDINGS: 3 views of the right knee demonstrate no acute fracture or subluxation. There is no significant soft tissue swelling. There is no joint effusion.     No acute process. Electronically signed by Tesfaye Back    ------------------------------------------------------------------------------------------------------------  The evaluation and treatment you received in the Emergency Department were for an urgent problem. It is important that you follow-up with a doctor, nurse practitioner, or physician assistant to:  (1) confirm your diagnosis,  (2) re-evaluation of changes in your illness and treatment, and (3) for ongoing care. Please take your discharge instructions with you when you go to your follow-up appointment.     If you have any problem arranging a follow-up appointment, contact us!  If your symptoms become worse or you do not improve as expected, please return to us. We are available 24 hours a day.     If a prescription has been provided, please fill it as soon as possible to prevent a delay in treatment. If you have any questions or reservations about taking the medication due to side effects or  interactions with other medications, please call your primary care provider or contact us directly.  Again, THANK YOU for choosing us to care for YOU!
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
## 2025-09-03 ENCOUNTER — OFFICE VISIT (OUTPATIENT)
Age: 35
End: 2025-09-03
Payer: MEDICAID

## 2025-09-03 VITALS
BODY MASS INDEX: 23.95 KG/M2 | WEIGHT: 135.19 LBS | DIASTOLIC BLOOD PRESSURE: 95 MMHG | SYSTOLIC BLOOD PRESSURE: 137 MMHG

## 2025-09-03 DIAGNOSIS — Z01.419 ENCOUNTER FOR ANNUAL ROUTINE GYNECOLOGICAL EXAMINATION: Primary | ICD-10-CM

## 2025-09-03 DIAGNOSIS — R10.2 ACUTE PELVIC PAIN, FEMALE: ICD-10-CM

## 2025-09-03 DIAGNOSIS — Z11.3 SCREENING EXAMINATION FOR STD (SEXUALLY TRANSMITTED DISEASE): ICD-10-CM

## 2025-09-03 DIAGNOSIS — R03.0 ELEVATED BLOOD PRESSURE READING: ICD-10-CM

## 2025-09-03 PROCEDURE — 99395 PREV VISIT EST AGE 18-39: CPT | Performed by: OBSTETRICS & GYNECOLOGY

## 2025-09-04 ENCOUNTER — TELEPHONE (OUTPATIENT)
Dept: PRIMARY CARE CLINIC | Facility: CLINIC | Age: 35
End: 2025-09-04

## 2025-09-05 LAB
A VAGINAE DNA VAG QL NAA+PROBE: ABNORMAL SCORE
BVAB2 DNA VAG QL NAA+PROBE: ABNORMAL SCORE
C ALBICANS DNA VAG QL NAA+PROBE: NEGATIVE
C GLABRATA DNA VAG QL NAA+PROBE: NEGATIVE
C TRACH DNA SPEC QL NAA+PROBE: NEGATIVE
MEGA1 DNA VAG QL NAA+PROBE: ABNORMAL SCORE
N GONORRHOEA DNA VAG QL NAA+PROBE: NEGATIVE
T VAGINALIS DNA VAG QL NAA+PROBE: NEGATIVE